# Patient Record
Sex: FEMALE | Race: ASIAN
[De-identification: names, ages, dates, MRNs, and addresses within clinical notes are randomized per-mention and may not be internally consistent; named-entity substitution may affect disease eponyms.]

---

## 2018-05-09 ENCOUNTER — HOSPITAL ENCOUNTER (OUTPATIENT)
Dept: HOSPITAL 88 - CT | Age: 38
End: 2018-05-09
Attending: UROLOGY
Payer: COMMERCIAL

## 2018-05-09 DIAGNOSIS — R31.0: Primary | ICD-10-CM

## 2018-05-09 PROCEDURE — 74178 CT ABD&PLV WO CNTR FLWD CNTR: CPT

## 2018-05-09 NOTE — DIAGNOSTIC IMAGING REPORT
EXAM: CT Abdomen and Pelvis WITHOUT and WITH contrast  

INDICATION:      

\S\HEMATURIA 

COMPARISON: None.

TECHNIQUE:  Abdomen and pelvis were scanned utilizing a multidetector helical

scanner from the lung base to the pubic symphysis before and after

administration of IV contrast. Coronal and sagittal reformations were obtained.

Hematuria (CT Urogram) protocol was performed. Scan was performed pre- in

supine position and nephrogenic/excretory phase with a 10 minute split bolus in

prone position.

     IV CONTRAST: 150 mL of Isovue-370

     ORAL CONTRAST: Water

            

COMPLICATIONS: None



RADIATION DOSE:

     Total DLP: 413.75 mGy*cm

     Estimated effective dose: (DLP x 0.015 x size factor) mSv

     CTDIvol has been reviewed. It is below the limits set by the Radiation

Protocol Committee (RPC).



FINDINGS:



LINES and TUBES: None.



LOWER THORAX:  Unremarkable



HEPATOBILIARY:      No focal hepatic lesions. No biliary ductal dilation. 



GALLBLADDER: No radio-opaque stones or sludge.  No wall thickening.



SPLEEN: No splenomegaly. 



PANCREAS: No focal masses or ductal dilatation.  



ADRENALS: No adrenal nodules    



KIDNEYS/URETERS: 



     Kidneys: Normal appearance bilaterally. No hydronephrosis or perinephric

stranding.

          Cyst: None.

          Mass: None.

          Stones: None.

     Upper collecting systems: No irregularities or filling defects.

     Ureters: Right ureter is fully opacified and normal in appearance. Distal

left ureter is not opacified, likely due to peristalsis.

     Bladder: No mass or filling defects.



GI TRACT: No abnormal distention, wall thickening, or evidence of bowel

obstruction.       Appendix is normal.



PELVIC ORGANS/BLADDER: Markedly enlarged multi fibroid uterus with mass effect

on left posterior bladder wall.



LYMPH NODES: No lymphadenopathy.



VESSELS: Unremarkable.



PERITONEUM / RETROPERITONEUM: No free air or fluid.



BONES: Unremarkable.



SOFT TISSUES: Unremarkable.            



IMPRESSION: 

Markedly enlarged multi fibroid uterus with mass effect on left posterior

bladder wall.

No nephrolithiasis or evidence of obstructive urolithiasis.

No filling defects within opacified ureters/collecting system.



Signed by: Dr. Garry Koch MD on 5/9/2018 2:06 PM